# Patient Record
Sex: MALE | Race: WHITE
[De-identification: names, ages, dates, MRNs, and addresses within clinical notes are randomized per-mention and may not be internally consistent; named-entity substitution may affect disease eponyms.]

---

## 2018-03-23 ENCOUNTER — HOSPITAL ENCOUNTER (EMERGENCY)
Dept: HOSPITAL 45 - C.EDB | Age: 23
Discharge: HOME | End: 2018-03-23
Payer: COMMERCIAL

## 2018-03-23 VITALS — DIASTOLIC BLOOD PRESSURE: 70 MMHG | HEART RATE: 64 BPM | SYSTOLIC BLOOD PRESSURE: 112 MMHG

## 2018-03-23 VITALS
BODY MASS INDEX: 26.86 KG/M2 | HEIGHT: 75 IN | WEIGHT: 216.05 LBS | WEIGHT: 216.05 LBS | BODY MASS INDEX: 26.86 KG/M2 | HEIGHT: 75 IN

## 2018-03-23 VITALS — TEMPERATURE: 98.6 F | OXYGEN SATURATION: 96 %

## 2018-03-23 DIAGNOSIS — S92.255A: Primary | ICD-10-CM
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
## 2018-03-23 NOTE — DIAGNOSTIC IMAGING REPORT
L FOOT MIN 3 VIEWS ROUTINE



CLINICAL HISTORY: LEFT EVAL FX trauma. Pain.



COMPARISON: None.



DISCUSSION: Subacute fracture posterior aspect tarsal navicular. Alignment is

anatomic. Remaining osseous structures are considered unremarkable. No

additional fractures identified. There is no evidence for soft tissue swelling.



IMPRESSION: Subacute fracture posterior aspect tarsal navicular.











The above report was generated using voice recognition software.  It may contain

grammatical, syntax or spelling errors.







Electronically signed by:  Josh Melendez M.D.

3/23/2018 4:18 PM



Dictated Date/Time:  3/23/2018 4:16 PM

## 2018-03-23 NOTE — EMERGENCY ROOM VISIT NOTE
ED Visit Note


First contact with patient:  15:49


CHIEF COMPLAINT: Left foot injury 1 day ago





HPI: Patient is a 22-year-old male who presents the emergency department for 

evaluation of pain in his left foot after an injury last evening.  He reports 

that he was wearing a soft top shoes, and his foot was accidentally stepped on 

by someone wearing boots.  He notes pain and swelling in the dorsum of his foot

, he points to the mid metatarsal region where he is having the most pain.  He 

took Aleve, and borrowed an ankle brace from a friend for support.  He states 

pain is worse when he tries to plantarflex, and when he tries to bear weight.  

He denies any ankle pain.  





REVIEW OF SYSTEMS:   Review of systems as per HPI.  All other systems reviewed 

were negative.  At least 6 systems reviewed.





PMH: Electronic medical records are reviewed and summarized as above/below.  

See Problem List.


 


SOCIAL HISTORY:  Patient is a college student originally from [place], lives 

locally in an apartment.  Non-smoker.


    


PHYSICAL EXAM: Vital Signs: Reviewed Nurse's notes.  CONSTITUTIONAL: Patient is 

a well-appearing 22-year-old male who is awake and alert and in no acute 

distress.  MUSCULOSKELETAL: Examination of the left foot show mild dorsal soft 

tissue swelling.  First metatarsal and Lisfranc joint are nontender.  He has 

pain primarily over the mid second, third and fourth metatarsal regions.  Fifth 

metatarsal is nontender.  He has pain primarily with dorsiflexion and plantar 

flexion and inversion and eversion of the midfoot.  Ankle is nontender to 

palpation.  Skin is intact.  Capillary refill less than 2 seconds.  Sensation 

light touch is intact over the left lower extremity.


 


EMERGENCY DEPARTMENT COURSE: X-ray of the left foot reveals a subacute fracture 

of the tarsal navicular.  His pain is more in the mid metatarsal distribution, 

but given the x-ray findings, patient was wrapped with an Ace wrap and placed 

in a walking boot.  Crutches were issued and he was instructed on a weight-bear 

as tolerated gait.  He will be referred to orthopedics for follow-up of the 

fracture.  Differential diagnoses included fracture, sprain, contusion, 

dislocation, among others.





Medication reconciliation: I attest that I have personally reviewed the patient'

s current medication list.





Blood pressure screening : Patient was found to have normal blood pressure on 

screening and does not require follow-up.








L FOOT MIN 3 VIEWS ROUTINE





CLINICAL HISTORY: LEFT EVAL FX trauma. Pain.





COMPARISON: None.





DISCUSSION: Subacute fracture posterior aspect tarsal navicular. Alignment is


anatomic. Remaining osseous structures are considered unremarkable. No


additional fractures identified. There is no evidence for soft tissue swelling.





IMPRESSION: Subacute fracture posterior aspect tarsal navicular.


Problem List


Medical Problems:


(1) Left ankle sprain


Status: Resolved  











Current/Historical Medications


No Active Prescriptions or Reported Meds





Allergies


Coded Allergies:  


     No Known Allergies (Unverified , 3/23/18)





Vital Signs











  Date Time  Temp Pulse Resp B/P (MAP) Pulse Ox O2 Delivery O2 Flow Rate FiO2


 


3/23/18 17:14  64 16 112/70    


 


3/23/18 15:44 37.0 100 16 129/72 96   











Departure Information


Impression





 Primary Impression:  


 Fracture of tarsal bone of left foot





Prescriptions





No Active Prescriptions or Reported Meds





Referrals


No Doctor, Assigned (PCP)








José Miguel Foley D.O.





Patient Instructions


[facility]





Additional Instructions





Ibuprofen(Motrin, Advil) may be used for fever or pain.  Use 600mg every six 

hours as needed.  Take with food.  Avoid using more than 2400mg in a 24 hour 

period.  Do not use 2400mg per day for more than three consecutive days without 

physician direction.  Prolonged inappropriate use can lead to stomach upset or 

ulcers.  This medication can be taken if you need to drive, work, or perform 

activities which may be dangerous when taking narcotic pain medication.


(AND/OR)


Acetaminophen(Tylenol) may be used for fever or pain.  Use 1000mg every six 

hours as needed.  Avoid using more than 3000mg in a 24 hour period.  This 

medication can be taken if you need to drive, work, or perform activities which 

may be dangerous when taking narcotic pain medication.





Ice compresses for 20 minutes at a time four times daily for 2-3 days.





Use the crutches and walking boot as instructed. 





Rest and elevate your injury.





Do not get the boot wet.  If your boot feels excessively tight, you have 

worsening pain, develop numbness or tingling, or your digits appear blue, 

loosen the ace wrap and straps. Then reapply the ace wrap and straps gently.  

If your symptoms are not quickly relieved return to the ER for re-evaluation.





Continue current medications.





Return to the ER immediately for any numbness, tingling, severe pain, extreme 

swelling in the extremity or as needed.





Call [facility] on Monday to arrange follow up for your injury.